# Patient Record
Sex: MALE | Race: BLACK OR AFRICAN AMERICAN | NOT HISPANIC OR LATINO
[De-identification: names, ages, dates, MRNs, and addresses within clinical notes are randomized per-mention and may not be internally consistent; named-entity substitution may affect disease eponyms.]

---
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
## 2024-04-18 NOTE — ED PROVIDER NOTE - IV ALTEPLASE DOOR HIDDEN
[de-identified] : The patient has bilateral knee pain due to patellofemoral syndrome. They are not an appropriate candidate for surgical intervention at this time. An extensive discussion was conducted on the natural history of the disease and the variety of surgical and non-surgical options available to the patient including, but not limited to non-steroidal anti-inflammatory medications, steroid injections, physical therapy, maintenance of ideal body weight, and reduction of activity. Prescriptions for meloxicam and physical therapy were given today. The patient will schedule an appointment as needed.    show

## 2024-06-06 ENCOUNTER — APPOINTMENT (OUTPATIENT)
Dept: ORTHOPEDIC SURGERY | Facility: CLINIC | Age: 24
End: 2024-06-06
Payer: OTHER GOVERNMENT

## 2024-06-06 ENCOUNTER — NON-APPOINTMENT (OUTPATIENT)
Age: 24
End: 2024-06-06

## 2024-06-06 VITALS — WEIGHT: 245 LBS | HEIGHT: 77 IN | BODY MASS INDEX: 28.93 KG/M2

## 2024-06-06 PROCEDURE — 99203 OFFICE O/P NEW LOW 30 MIN: CPT

## 2024-06-06 PROCEDURE — 73630 X-RAY EXAM OF FOOT: CPT | Mod: RT

## 2024-06-07 ENCOUNTER — APPOINTMENT (OUTPATIENT)
Dept: MRI IMAGING | Facility: CLINIC | Age: 24
End: 2024-06-07

## 2024-06-07 ENCOUNTER — OUTPATIENT (OUTPATIENT)
Dept: OUTPATIENT SERVICES | Facility: HOSPITAL | Age: 24
LOS: 1 days | End: 2024-06-07
Payer: COMMERCIAL

## 2024-06-07 DIAGNOSIS — S96.911A STRAIN OF UNSPECIFIED MUSCLE AND TENDON AT ANKLE AND FOOT LEVEL, RIGHT FOOT, INITIAL ENCOUNTER: ICD-10-CM

## 2024-06-07 PROCEDURE — 73721 MRI JNT OF LWR EXTRE W/O DYE: CPT

## 2024-06-07 PROCEDURE — 73721 MRI JNT OF LWR EXTRE W/O DYE: CPT | Mod: 26,RT

## 2024-06-13 ENCOUNTER — APPOINTMENT (OUTPATIENT)
Dept: ORTHOPEDIC SURGERY | Facility: CLINIC | Age: 24
End: 2024-06-13

## 2024-06-19 ENCOUNTER — APPOINTMENT (OUTPATIENT)
Dept: ORTHOPEDIC SURGERY | Facility: CLINIC | Age: 24
End: 2024-06-19
Payer: OTHER GOVERNMENT

## 2024-06-19 DIAGNOSIS — M21.41 FLAT FOOT [PES PLANUS] (ACQUIRED), RIGHT FOOT: ICD-10-CM

## 2024-06-19 DIAGNOSIS — S96.911D STRAIN OF UNSPECIFIED MUSCLE AND TENDON AT ANKLE AND FOOT LEVEL, RIGHT FOOT, SUBSEQUENT ENCOUNTER: ICD-10-CM

## 2024-06-19 DIAGNOSIS — S99.921D UNSPECIFIED INJURY OF RIGHT FOOT, SUBSEQUENT ENCOUNTER: ICD-10-CM

## 2024-06-19 PROCEDURE — 99213 OFFICE O/P EST LOW 20 MIN: CPT

## 2024-06-19 NOTE — HISTORY OF PRESENT ILLNESS
[A CAM Boot] : a CAM boot [FreeTextEntry1] : Pt, JERRY, presents with R foot pain x 1 day. Pt states that [place] the [employer] he sustained injury to R midfoot after he tried kicking a kick ball and hit the home base plate. As a result he has been experiencing pain and swelling in dorsum of R foot and ball of the foot. He was evaluated at [facility], ice & ACE wrap was applied. He states he has been using crutches to ambulate. Denies any numbness/tingling. Denies additional musculoskeletal complaints referable to foot/ankle.

## 2024-06-19 NOTE — PHYSICAL EXAM
[Normal] : Oriented to person, place, and time, insight and judgement were intact and the affect was normal [de-identified] : Extremity: +equinus (releases) R LE, +PPAV R foot (supple), interval decreased soft tissue swelling and resolving tenderness LF region R midfoot. Nontender R ankle, peroneals, syndesmosis, Achilles, hindfoot ST, PTT insertional, and forefoot / base 5th metatarsal. Calves soft and nontender, sensorimotor unchanged, skin intact R LE. AOx3, mood / affect normal. [de-identified] : MICHI, NAD [de-identified] : EXAM: [id number] - MR ANKLE RT - ORDERED BY: ALEM LY PROCEDURE DATE: 06/07/2024 INTERPRETATION: Clinical indication: Right ankle injury Technique: Multiplanar multi-sequential MR sequences of the right ankle were obtained without contrast according to standard protocol. Comparison: There are no prior exams available for comparison.  Findings:  There is T2 signal hyperintensity within the dorsal component of the Lisfranc ligamentous complex, in keeping with sequelae of recent strain. No associated widening of the Lisfranc interval. There are contusions/microtrabecular fractures involving the first metatarsal base in addition to the third and fourth metatarsal bases in addition to the plantar aspect of the medial cuneiform.  Probable chronic sprains of the anterior and posterior tibiofibular ligaments as well as chronic partial tearing of the anterolateral tibiotalar joint capsule. The anterior and posterior talofibular ligaments, as well as the calcaneofibular and deltoid ligaments are intact.  There is mild posterior tibial tendinosis with superimposed mild longitudinal splitting at the navicular insertion. The medial flexor and anterior extensor tendons as well as the peroneal tendons are are otherwise intact. There is mild Achilles tendinosis with superimposed minimal intrasubstance longitudinal splitting. The plantar fascia is intact.  There is no joint effusion. The subtalar joint, talonavicular joint, and calcaneocuboid joints are maintained. The sinus tarsi fat is preserved.  The talar dome is normal in contour and signal without evidence of osteochondral defect or avascular necrosis.  Impression:  Recent strain of the dorsal component of the Lisfranc ligamentous complex. No associated widening of the Lisfranc interval. There are contusions/microtrabecular fractures involving the first metatarsal base in addition to the third and fourth metatarsal bases in addition to the plantar aspect of the medial cuneiform.  --- End of Report ---  IRIS ROSA MBA-EVERETT CHAVEZ; Attending Radiologist

## 2024-06-19 NOTE — DISCUSSION/SUMMARY
[de-identified] : Options reviewed, continue CAM boot immobilization and CAM boot ambulation, activity modification, HEP.  Return to office in 3 weeks / PRN, repeat radiographs (standing) and anticipate transition to NB shoe wear at next visit.  All questions answered.

## 2024-07-22 ENCOUNTER — APPOINTMENT (OUTPATIENT)
Dept: ORTHOPEDIC SURGERY | Facility: CLINIC | Age: 24
End: 2024-07-22
Payer: OTHER GOVERNMENT

## 2024-07-22 VITALS — WEIGHT: 245 LBS | BODY MASS INDEX: 28.93 KG/M2 | HEIGHT: 77 IN

## 2024-07-22 DIAGNOSIS — S96.911D STRAIN OF UNSPECIFIED MUSCLE AND TENDON AT ANKLE AND FOOT LEVEL, RIGHT FOOT, SUBSEQUENT ENCOUNTER: ICD-10-CM

## 2024-07-22 DIAGNOSIS — M21.41 FLAT FOOT [PES PLANUS] (ACQUIRED), RIGHT FOOT: ICD-10-CM

## 2024-07-22 DIAGNOSIS — S99.921D UNSPECIFIED INJURY OF RIGHT FOOT, SUBSEQUENT ENCOUNTER: ICD-10-CM

## 2024-07-22 DIAGNOSIS — S93.621D SPRAIN OF TARSOMETATARSAL LIGAMENT OF RIGHT FOOT, SUBSEQUENT ENCOUNTER: ICD-10-CM

## 2024-07-22 PROCEDURE — 99213 OFFICE O/P EST LOW 20 MIN: CPT

## 2024-07-22 PROCEDURE — 73630 X-RAY EXAM OF FOOT: CPT | Mod: RT

## 2024-07-22 NOTE — PHYSICAL EXAM
[Normal] : Oriented to person, place, and time, insight and judgement were intact and the affect was normal [de-identified] : Extremity: +equinus (releases) R LE, +PPAV R foot (supple), interval decreased soft tissue swelling and nearly resolved tenderness LF region R midfoot. Nontender R ankle, peroneals, syndesmosis, Achilles, hindfoot ST, PTT insertional, and forefoot / base 5th metatarsal. Calves soft and nontender, sensorimotor unchanged, skin intact R LE. AOx3, mood / affect normal.   [de-identified] : MICHI, NAD [de-identified] : Radiographs (3v R foot) reveal PTS R foot, Reaves's R foot, healing injury LF region R midfoot.

## 2024-07-22 NOTE — DISCUSSION/SUMMARY
[de-identified] : Options reviewed, transition from CAM boot ambulation to NB shoe wear / RBS, activity modification (low impact), HEP.  Return to office in 3 - 4 weeks / PRN, all questions answered.

## 2024-07-22 NOTE — HISTORY OF PRESENT ILLNESS
[FreeTextEntry1] : Pt, JERRY, presents for follow-up with R midfoot injury (6/5/24).  Pt states that at the [employer] he sustained injury to R midfoot after he tried kicking a kick ball and hit the home base plate. Denies any numbness/tingling.  He has been wearing his CAM boot until two days ago when it broke, prior to that he was wearing it all day. he has been walking in lace up sneakers since then. He endorses some stiffness in his midfoot, also stiffness when dorsilfexing his great toe. he has not been taking medication for this. Denies additional musculoskeletal complaints referable to foot/ankle.

## 2024-08-20 ENCOUNTER — APPOINTMENT (OUTPATIENT)
Dept: ORTHOPEDIC SURGERY | Facility: CLINIC | Age: 24
End: 2024-08-20
Payer: OTHER GOVERNMENT

## 2024-08-20 VITALS — BODY MASS INDEX: 28.93 KG/M2 | WEIGHT: 245 LBS | HEIGHT: 77 IN

## 2024-08-20 DIAGNOSIS — S93.621D SPRAIN OF TARSOMETATARSAL LIGAMENT OF RIGHT FOOT, SUBSEQUENT ENCOUNTER: ICD-10-CM

## 2024-08-20 DIAGNOSIS — S99.921D UNSPECIFIED INJURY OF RIGHT FOOT, SUBSEQUENT ENCOUNTER: ICD-10-CM

## 2024-08-20 DIAGNOSIS — T14.8XXA OTHER INJURY OF UNSPECIFIED BODY REGION, INITIAL ENCOUNTER: ICD-10-CM

## 2024-08-20 DIAGNOSIS — S96.911D STRAIN OF UNSPECIFIED MUSCLE AND TENDON AT ANKLE AND FOOT LEVEL, RIGHT FOOT, SUBSEQUENT ENCOUNTER: ICD-10-CM

## 2024-08-20 DIAGNOSIS — M21.41 FLAT FOOT [PES PLANUS] (ACQUIRED), RIGHT FOOT: ICD-10-CM

## 2024-08-20 PROCEDURE — 99213 OFFICE O/P EST LOW 20 MIN: CPT

## 2024-09-18 ENCOUNTER — APPOINTMENT (OUTPATIENT)
Dept: ORTHOPEDIC SURGERY | Facility: CLINIC | Age: 24
End: 2024-09-18
Payer: OTHER GOVERNMENT

## 2024-09-18 VITALS — BODY MASS INDEX: 29.52 KG/M2 | HEIGHT: 77 IN | WEIGHT: 250 LBS

## 2024-09-18 DIAGNOSIS — S93.621D SPRAIN OF TARSOMETATARSAL LIGAMENT OF RIGHT FOOT, SUBSEQUENT ENCOUNTER: ICD-10-CM

## 2024-09-18 DIAGNOSIS — M21.41 FLAT FOOT [PES PLANUS] (ACQUIRED), RIGHT FOOT: ICD-10-CM

## 2024-09-18 DIAGNOSIS — T14.8XXA OTHER INJURY OF UNSPECIFIED BODY REGION, INITIAL ENCOUNTER: ICD-10-CM

## 2024-09-18 DIAGNOSIS — S96.911D STRAIN OF UNSPECIFIED MUSCLE AND TENDON AT ANKLE AND FOOT LEVEL, RIGHT FOOT, SUBSEQUENT ENCOUNTER: ICD-10-CM

## 2024-09-18 PROCEDURE — 99213 OFFICE O/P EST LOW 20 MIN: CPT

## 2024-09-25 NOTE — DISCUSSION/SUMMARY
[de-identified] : Options reviewed, NB shoe wear, activity progression protocol, ankle / foot rehabilitation and HEP.  Return to office PRN, all questions answered.

## 2024-09-25 NOTE — HISTORY OF PRESENT ILLNESS
[Sneakers] : lorenzo [FreeTextEntry1] : Pt, JERRY, presents for follow-up with R midfoot injury (6/5/24). Patient reports improvement of symptoms since last visit. He has been running without pain in the foot. He presents in a regular sneaker. Pt states that at the [employer] he sustained injury to R midfoot after he tried kicking a kick ball and hit the home base plate. Denies any numbness/tingling. Denies additional musculoskeletal complaints referable to foot/ankle.

## 2024-09-25 NOTE — PHYSICAL EXAM
[Normal] : Oriented to person, place, and time, insight and judgement were intact and the affect was normal [de-identified] : Extremity: +equinus (releases) R LE, +PPAV R foot (supple), resolving soft tissue swelling and nontender LF region R midfoot. Nontender R ankle, peroneals, syndesmosis, Achilles, hindfoot ST, PTT insertional, and forefoot / base 5th metatarsal. Calves soft and nontender, able to perform R SLHR testing, sensorimotor unchanged, skin intact R LE. AOx3, mood / affect normal.   [de-identified] : MICHI, NAD

## 2024-09-25 NOTE — PHYSICAL EXAM
[Normal] : Oriented to person, place, and time, insight and judgement were intact and the affect was normal [de-identified] : Extremity: +equinus (releases) R LE, +PPAV R foot (supple), resolving soft tissue swelling and nontender LF region R midfoot. Nontender R ankle, peroneals, syndesmosis, Achilles, hindfoot ST, PTT insertional, and forefoot / base 5th metatarsal. Calves soft and nontender, able to perform R SLHR testing, sensorimotor unchanged, skin intact R LE. AOx3, mood / affect normal.   [de-identified] : MICHI, NAD

## 2024-09-25 NOTE — DISCUSSION/SUMMARY
[de-identified] : Options reviewed, NB shoe wear, activity progression protocol, ankle / foot rehabilitation and HEP.  Return to office PRN, all questions answered.
